# Patient Record
Sex: MALE | Race: WHITE
[De-identification: names, ages, dates, MRNs, and addresses within clinical notes are randomized per-mention and may not be internally consistent; named-entity substitution may affect disease eponyms.]

---

## 2017-01-24 NOTE — GHP
[f rep st]



                                                       PREOP HISTORY AND PHYSICAL





DATE OF ADMISSION:  01/26/2017



CHIEF COMPLAINT:  Bilateral inguinal hernias.



HISTORY OF PRESENT ILLNESS:  This is a 56-year-old male referred to me by his primary care physician 
with an approximate 1 year history of bilateral inguinal hernias.  The patient states that he initial
ly noted small bulges on both sides, albeit right greater than left, approximately 1 year ago.  He st
ates that over the ensuing year the hernias have increased in size and symptomatology to the point wh
ere strenuous activity now causes significant pain.  His wife also endorses these findings and that t
hey are significantly bigger than they were previously.  He works in maintenance and given the nature
 of his job he has noticed more symptoms as of late.  He denies any obstructive symptoms and otherwis
e feels well.



PAST MEDICAL HISTORY:  Hypertriglyceridemia, obstructive sleep apnea, epilepsy, hypertension, allergi
c rhinitis, erectile dysfunction, psoriasis, hypoglycemia and testosterone deficiency.



PAST SURGICAL HISTORY:  None.



MEDICATIONS:  

Fluocinolone,fluocinonide, phenytoin, pravastatin, Symbicort, testosterone, Nasacort, Percocet.



ALLERGIES:  None.



SOCIAL HISTORY:  Works in maintenance.  Social drinker.  Denies illicit drug use.



REVIEW OF SYSTEMS:  A full 10-point review was performed and unless explicitly stated above is otherw
ise negative.



PHYSICAL EXAMINATION:  GENERAL:  Alert and oriented in no acute distress.  CARDIOVASCULAR:  Has a reg
ular rate and rhythm without murmurs.  LUNGS:  Clear to auscultation bilaterally. ABDOMEN:  Bowel roman
nds are present, soft, nondistended and nontender.  Bilateral bulges, right greater than left, adjace
nt to the pubic tubercle, consistent with bilateral direct inguinal hernias, both reduced with genera
l pressure but are significantly tender.  EXTREMITIES:  Warm.



ASSESSMENT AND PLAN:  A 56-year-old male with bilateral inguinal hernias.  Given the bilaterality of 
his disease and his progressive symptoms, I offered repair.  He agreed after explaining the risks, be
nefits and alternatives.  We will plan for laparoscopic hernia repair on the 26th.





Job #:  [id number]

## 2017-01-26 ENCOUNTER — HOSPITAL ENCOUNTER (OUTPATIENT)
Dept: HOSPITAL 80 - FSGY | Age: 57
Discharge: HOME | End: 2017-01-26
Attending: SURGERY
Payer: COMMERCIAL

## 2017-01-26 DIAGNOSIS — E78.1: ICD-10-CM

## 2017-01-26 DIAGNOSIS — I10: ICD-10-CM

## 2017-01-26 DIAGNOSIS — K40.20: Primary | ICD-10-CM

## 2017-01-26 DIAGNOSIS — G47.30: ICD-10-CM

## 2017-01-26 DIAGNOSIS — G40.909: ICD-10-CM

## 2017-01-26 PROCEDURE — 0YUA4JZ SUPPLEMENT BILATERAL INGUINAL REGION WITH SYNTHETIC SUBSTITUTE, PERCUTANEOUS ENDOSCOPIC APPROACH: ICD-10-PCS | Performed by: SURGERY

## 2017-01-26 PROCEDURE — C1781 MESH (IMPLANTABLE): HCPCS

## 2017-01-26 NOTE — GOP
[f rep st]



                                                                OPERATIVE REPORT





DATE OF OPERATION:  01/26/2017



SURGEON:  Jacob Maynard MD



ASSISTANT:  Jean-Pierre Faye MD



ANESTHESIA:  General endotracheal.



ANESTHESIOLOGIST:  Jun Rodriguez MD



PREOPERATIVE DIAGNOSIS:  Bilateral inguinal hernias.



POSTOPERATIVE DIAGNOSIS:  Bilateral inguinal hernias.



PROCEDURE PERFORMED:  Laparoscopic bilateral inguinal hernia repair with mesh.



FINDINGS:  Large direct hernia sacs noted bilaterally, right greater than left, both successfully red
uced and patched appropriately with Parietex preformed mesh.



SPECIMENS:  None.



DESCRIPTION OF PROCEDURE:  The patient was greeted in the preoperative suite.  Once again, risks, wendi
efits, and alternatives were discussed at length, at which point in time the consent was signed.  He 
was then brought back to the operative suite, placed on the OR table in the supine position.  After a
ll anesthesia machines, including SCDs, were on and functioning, a [employer] time-out
 was performed, ending with all in agreement.  Antibiotics were given on-call to the operating room. 
 The patient's abdomen was then widely prepped and draped in a typical sterile fashion.  I entered th
e abdomen via an infraumbilical curvilinear incision.  I carried this down to the subcutaneous tissue
, where the rectus sheath was identified.  I opened the anterior rectus sheath, dissected bluntly deborah
p to the posterior rectus sheath and then bluntly using the balloon dissector, was able to dissect ou
t the space under direct visualization.  This was then removed.  The insufflation port was then place
d.  I then insufflated to 15 mmHg CO2, which was well tolerated by the patient.  I placed 2 additiona
l 5 mm trocars, 1 in the suprapubic, and the other 1 intermediate between the 2 ports in the midline, both
 under direct visualization.  I first turned my attention toward the right side.  I began making my p
beny laterally, taking down the subcutaneous tissue in the preperitoneal space.  Once I successfully 
did this, I encountered a large direct hernia sac and successfully reduced this.  I then skeletonized
 the cord structures appropriately.  After I identified no cord lipoma or indirect hernia sac, I plac
ed a piece of preformed laparoscopic hernia Parietex mesh, into the patient's abdomen, tacked it medi
ally to the pubic tubercle, and allowed it to lie significantly above the large direct defect.  I tac
ked it medial there, allowing for 1 tack lateral to the inferior epigastric vessels to hold it there 
appropriately.  After I felt that the mesh was appropriately placed, I turned my attention toward the
 left, where in the exact same fashion, I identified again the large direct hernia sac and reduced it
.  I skeletonized the cord structures and identified no indirect hernia sac.  In the same fashion, I 
placed an additional piece of mesh, although this time anatomical left side, and tacked it in a simil
ar fashion.  After appropriately placing the mesh, I evacuated my insufflation while watching to make
 sure that the mesh did not kink inappropriately, which it did not.  I infiltrated local anesthetic i
nto the port sites.  I closed my midline 10 mm trocar site with an interrupted figure-of-eight 0 Vicr
yl stitch, noting excellent fascial reapproximation.  The skin was then closed with interrupted 4-0 M
onocryl over which Dermabond was placed.  The patient was then extubated in the operative suite and krista epps to the PACU in satisfactory condition.



DRAINS:  None.



COUNTS:  All counts were reported as correct x2.





Job #:  [id number]

## 2018-04-18 ENCOUNTER — HOSPITAL ENCOUNTER (OUTPATIENT)
Dept: HOSPITAL 80 - FIMAGING | Age: 58
End: 2018-04-18
Attending: FAMILY MEDICINE
Payer: COMMERCIAL

## 2018-04-18 DIAGNOSIS — I25.10: ICD-10-CM

## 2018-04-18 DIAGNOSIS — R91.8: Primary | ICD-10-CM
